# Patient Record
(demographics unavailable — no encounter records)

---

## 2025-05-09 NOTE — HISTORY OF PRESENT ILLNESS
[FreeTextEntry1] : Patient is a 41-year-old with a last menstrual period of 4/23/2025 here for routine annual gynecological follow-up. Patient desires removal of IUD as she is not currently sexually active. Patient's last mammogram and breast sonogram was March 2024 patient is due for 6-month follow-up and has not done so to date. Patient with history of anterior calcified fibroids and adenomyosis Patient with history of uterine prolapse and a large cystocele and is to schedule surgery with Dr. Christiano Cedillo in July 2025

## 2025-05-09 NOTE — PHYSICAL EXAM
[Chaperoned Physical Exam] : A chaperone was present in the examining room during all aspects of the physical examination. [MA] : MA [Appropriately responsive] : appropriately responsive [Alert] : alert [No Acute Distress] : no acute distress [No Lymphadenopathy] : no lymphadenopathy [Soft] : soft [Non-tender] : non-tender [Non-distended] : non-distended [No HSM] : No HSM [No Lesions] : no lesions [No Mass] : no mass [Oriented x3] : oriented x3 [Examination Of The Breasts] : a normal appearance [No Masses] : no breast masses were palpable [Labia Majora] : normal [Labia Minora] : normal [IUD String] : an IUD string was noted [Normal] : normal [Anteversion] : anteverted [Uterine Adnexae] : normal [FreeTextEntry2] : Beti Spann [FreeTextEntry9] : Guaiac negative no masses

## 2025-05-09 NOTE — PLAN
[FreeTextEntry1] : Patient had a normal annual gynecological exam. Pap smear was done, HPV testing was done.  Gonorrhea and Chlamydia cultures were done. Bilateral diagnostic mammogram and sonogram was ordered at [facility] and compliance was stressed to patient. IUD ParaGard moved without difficulty. Safe sex reviewed with patient. Birth control options reviewed with patient. PHQ-9 depression score reviewed with patient: Patient's score is 6: Patient some has some recent life stressors but speaks to a therapist on a regular basis declines any further intervention here resources were offered if needed. Total 7 minutes spent with patient

## 2025-05-28 NOTE — HISTORY OF PRESENT ILLNESS
[Uterine Prolapse] : no [Urinary Frequency] : no [Feelings Of Urinary Urgency] : no [Urinary Frequency More Than Twice At Night (Nocturia)] : no nocturia [Urinary Tract Infection] : no [] : yes [Uses ___ pads per day] : uses [unfilled] pad(s) per day [FreeTextEntry4] : + menses, IUD was removed [FreeTextEntry5] : daily  [de-identified] : daily, also happens with walking [de-identified] : not sexually active - currently  [FreeTextEntry1] : Patient returns today for follow-up on her pelvic organ prolapse and urinary incontinence.  She is interested in surgical correction for the prolapse and incontinence.  Her chart was reviewed.  In the past that she had been scheduled in June 2023 and will was unable to do the surgery at that point in time as she had childcare issues.  In August 2024 she was scheduled for surgery but had to cancel due to an upper respiratory infection.  She underwent urodynamic testing in April 2023 which revealed stress urinary incontinence. no new symptoms since last visit She has tried a pessary in the past having daily BM's

## 2025-05-28 NOTE — DISCUSSION/SUMMARY
[FreeTextEntry1] : We reviewed the above findings.  We discussed surgical and nonsurgical treatment options and she is interested in surgical correction for the prolapse and we will proceed with an anterior and posterior colporrhaphy and for the stress incontinence with a mid urethral sling.  We discussed that surgery is not intended to treat overactive bladder symptoms and these may persist or worsen postoperatively.  We discussed the possibility of going home with a catheter.  IUGA patient information on anterior posterior colporrhaphy and mid urethral sling was given to her.  All questions answered

## 2025-05-28 NOTE — PHYSICAL EXAM
[MA] : MA [Well developed] : well developed [Well Nourished] : ~L well nourished [Good Hygeine] : demonstrates good hygeine [Warm and Dry] : was warm and dry to touch [Normal Appearance] : general appearance was normal [Rectocele] : a rectocele [Cystocele] : a cystocele [Aa ____] : Aa [unfilled] [Ba ____] : Ba [unfilled] [C ____] : C [unfilled] [GH ____] : GH [unfilled] [PB ____] : PB [unfilled] [TVL ____] : TVL  [unfilled] [Ap ____] : Ap [unfilled] [Bp ____] : Bp [unfilled] [D ____] : D [unfilled] [Normal] : normal [FreeTextEntry2] : Isamar [Distended] : not distended [H/Smegaly] : no hepatosplenomegaly [FreeTextEntry3] : Positive hypermobility [de-identified] : Positive fibroid, patient knows about it